# Patient Record
Sex: FEMALE | URBAN - METROPOLITAN AREA
[De-identification: names, ages, dates, MRNs, and addresses within clinical notes are randomized per-mention and may not be internally consistent; named-entity substitution may affect disease eponyms.]

---

## 2022-04-01 ENCOUNTER — APPOINTMENT (RX ONLY)
Dept: URBAN - METROPOLITAN AREA CLINIC 333 | Facility: CLINIC | Age: 37
Setting detail: DERMATOLOGY
End: 2022-04-01

## 2022-04-01 DIAGNOSIS — Z41.9 ENCOUNTER FOR PROCEDURE FOR PURPOSES OTHER THAN REMEDYING HEALTH STATE, UNSPECIFIED: ICD-10-CM

## 2022-04-01 PROCEDURE — ? BOTOX

## 2022-04-01 PROCEDURE — ? ADDITIONAL NOTES

## 2022-04-01 ASSESSMENT — LOCATION DETAILED DESCRIPTION DERM
LOCATION DETAILED: SUPERIOR MID FOREHEAD
LOCATION DETAILED: RIGHT CENTRAL EYEBROW
LOCATION DETAILED: LEFT CENTRAL EYEBROW
LOCATION DETAILED: GLABELLA

## 2022-04-01 ASSESSMENT — LOCATION SIMPLE DESCRIPTION DERM
LOCATION SIMPLE: LEFT EYEBROW
LOCATION SIMPLE: GLABELLA
LOCATION SIMPLE: SUPERIOR FOREHEAD
LOCATION SIMPLE: RIGHT EYEBROW

## 2022-04-01 ASSESSMENT — LOCATION ZONE DERM: LOCATION ZONE: FACE

## 2022-04-01 NOTE — PROCEDURE: BOTOX
Patient Specific Comments (Will Not Stick From Patient To Patient): Pt inquired about what can be done for they eye area but had trouble verbalizing what she was asking about. She made the motion of lifting her lateral canthi - I advised her BT does not do any type of lifting there. She also asked if there is anything that can open her eyes more, which I advised BT to do a lateral eyebrow lift can help. She mentioned her excess upper eyelid skin, which I advised her BT is not mean to replace surgery to remove that excess eyelid skin (blepharoplasty). I reviewed pt's last visit note with us, from December 2020, which we had a similar conversation about blepharoplasty and lateral eyelid lift expectations. Referral for oculoplastics was given in December 2020. She wanted to proceed with visit and the BT in the zones discussed.\\n\\Tobiso advised pt she has 2 week complimentary FUC. She states she will not be back in the area until May, which I advised her unfortunately is too long of a time for her FUC. She wanted to proceed with visit.